# Patient Record
(demographics unavailable — no encounter records)

---

## 2025-01-16 NOTE — HISTORY OF PRESENT ILLNESS
[Spouse] : spouse [FreeTextEntry1] : 86F w/ CKD, HTN, vertigo, Asthma is coming in for follow up of tinnitus and cough. [de-identified] : 86F w/ CKD, HTN, vertigo, Asthma is coming in for follow up of tinnitus and cough.  Congested, phlegm, cough, nausea for 1 week. No fevers, myalgias.   Has an appointment with Nephrologist Dr. Faust in [facility] in the next few weeks.   Saw ENT, was told there is nothing she can do for her tinnitus, may self-resolve. Still symptomatic.

## 2025-01-16 NOTE — HISTORY OF PRESENT ILLNESS
[Spouse] : spouse [FreeTextEntry1] : 86F w/ CKD, HTN, vertigo, Asthma is coming in for follow up of tinnitus and cough. [de-identified] : 86F w/ CKD, HTN, vertigo, Asthma is coming in for follow up of tinnitus and cough.  Congested, phlegm, cough, nausea for 1 week. No fevers, myalgias.   Has an appointment with Nephrologist Dr. Faust in [facility] in the next few weeks.   Saw ENT, was told there is nothing she can do for her tinnitus, may self-resolve. Still symptomatic.

## 2025-06-01 NOTE — PHYSICAL EXAM
[No Respiratory Distress] : no respiratory distress  [No Accessory Muscle Use] : no accessory muscle use [Clear to Auscultation] : lungs were clear to auscultation bilaterally [Normal Rate] : normal rate  [Regular Rhythm] : with a regular rhythm [Normal Sclera/Conjunctiva] : normal sclera/conjunctiva [PERRL] : pupils equal round and reactive to light [EOMI] : extraocular movements intact [Normal] : the outer ears and nose were normal in appearance and the oropharynx was normal [No Lymphadenopathy] : no lymphadenopathy [Supple] : supple [Thyroid Normal, No Nodules] : the thyroid was normal and there were no nodules present [No Edema] : there was no peripheral edema [Soft] : abdomen soft [Non Tender] : non-tender [Non-distended] : non-distended [Normal Supraclavicular Nodes] : no supraclavicular lymphadenopathy [Normal Anterior Cervical Nodes] : no anterior cervical lymphadenopathy [No Focal Deficits] : no focal deficits [Speech Grossly Normal] : speech grossly normal [Normal Mood] : the mood was normal [de-identified] : Murmur at RUSB [de-identified] : does not know the year, poor recall, refused to complete rest of mini-mental status exam

## 2025-06-01 NOTE — HEALTH RISK ASSESSMENT
[Patient reported bone density results were abnormal] : Patient reported bone density results were abnormal [Yes] : Yes [Monthly or less (1 pt)] : Monthly or less (1 point) [1 or 2 (0 pts)] : 1 or 2 (0 points) [Never (0 pts)] : Never (0 points) [One fall no injury in past year] : Patient reported one fall in the past year without injury [0] : 2) Feeling down, depressed, or hopeless: Not at all (0) [PHQ-2 Negative - No further assessment needed] : PHQ-2 Negative - No further assessment needed [Lack of understanding] : lack of understanding [No] : Did not review medication list for presence of high-risk medications. [Former] : Former [20 or more] : 20 or more [> 15 Years] : > 15 Years [Audit-CScore] : 1 [CHS4Vsmcm] : 0 [Reports changes in hearing] : Reports no changes in hearing [Reports changes in vision] : Reports no changes in vision [MammogramDate] : 02/24 [MammogramComments] : BI-RADS 3 [BoneDensityDate] : 10/17 [BoneDensityComments] : Osteoporosis  [ColonoscopyDate] : 03/19

## 2025-06-01 NOTE — HISTORY OF PRESENT ILLNESS
[Family Member] : family member [de-identified] : 86F w/ CKD, HTN, Asthma, Osteoporosis is coming in for AWV.  History partially obtained from daughter, who is concerned about patient's memory.  Nephrologist Dr. Faust in [facility] Pulmonologist Dr. Zackary Camargo at [facility]  Following w/ Cardiology every 6 months.  Has no acute complaints today. Forgot to have DEXA and mammogram done. Patient does not remember what the plan from her Nephrologist was. Did not have Renal US done that was recommended by Nephro  Has been distraught due to  being in the hospital.   Following w/ Neurology but patient is unsure about name [FreeTextEntry1] : 86F w/ CKD, HTN, Asthma, Osteoporosis is coming in for AWV.

## 2025-06-01 NOTE — HEALTH RISK ASSESSMENT
[Patient reported bone density results were abnormal] : Patient reported bone density results were abnormal [Yes] : Yes [Monthly or less (1 pt)] : Monthly or less (1 point) [1 or 2 (0 pts)] : 1 or 2 (0 points) [Never (0 pts)] : Never (0 points) [One fall no injury in past year] : Patient reported one fall in the past year without injury [0] : 2) Feeling down, depressed, or hopeless: Not at all (0) [PHQ-2 Negative - No further assessment needed] : PHQ-2 Negative - No further assessment needed [Lack of understanding] : lack of understanding [No] : Did not review medication list for presence of high-risk medications. [Former] : Former [20 or more] : 20 or more [> 15 Years] : > 15 Years [Audit-CScore] : 1 [SUS3Runqo] : 0 [Reports changes in hearing] : Reports no changes in hearing [Reports changes in vision] : Reports no changes in vision [MammogramDate] : 02/24 [MammogramComments] : BI-RADS 3 [BoneDensityDate] : 10/17 [BoneDensityComments] : Osteoporosis  [ColonoscopyDate] : 03/19

## 2025-06-01 NOTE — HISTORY OF PRESENT ILLNESS
[Family Member] : family member [FreeTextEntry1] : 86F w/ CKD, HTN, Asthma, Osteoporosis is coming in for AWV.  [de-identified] : 86F w/ CKD, HTN, Asthma, Osteoporosis is coming in for AWV.  History partially obtained from daughter, who is concerned about patient's memory.  Nephrologist Dr. Faust in [facility] Pulmonologist Dr. Zackary Camargo at [facility]  Following w/ Cardiology every 6 months.  Has no acute complaints today. Forgot to have DEXA and mammogram done. Patient does not remember what the plan from her Nephrologist was. Did not have Renal US done that was recommended by Nephro  Has been distraught due to  being in the hospital.   Following w/ Neurology but patient is unsure about name

## 2025-06-01 NOTE — PHYSICAL EXAM
[No Respiratory Distress] : no respiratory distress  [No Accessory Muscle Use] : no accessory muscle use [Clear to Auscultation] : lungs were clear to auscultation bilaterally [Normal Rate] : normal rate  [Regular Rhythm] : with a regular rhythm [Normal Sclera/Conjunctiva] : normal sclera/conjunctiva [PERRL] : pupils equal round and reactive to light [EOMI] : extraocular movements intact [Normal] : the outer ears and nose were normal in appearance and the oropharynx was normal [No Lymphadenopathy] : no lymphadenopathy [Supple] : supple [Thyroid Normal, No Nodules] : the thyroid was normal and there were no nodules present [No Edema] : there was no peripheral edema [Soft] : abdomen soft [Non Tender] : non-tender [Non-distended] : non-distended [Normal Supraclavicular Nodes] : no supraclavicular lymphadenopathy [Normal Anterior Cervical Nodes] : no anterior cervical lymphadenopathy [No Focal Deficits] : no focal deficits [Speech Grossly Normal] : speech grossly normal [Normal Mood] : the mood was normal [de-identified] : Murmur at RUSB [de-identified] : does not know the year, poor recall, refused to complete rest of mini-mental status exam

## 2025-06-01 NOTE — ASSESSMENT
[FreeTextEntry1] : 86F w/ CKD, HTN, Asthma, Osteoporosis, Dementia is coming in for AWV.  #HTN -BP acceptable, c/w Diltiazem  #Dementia -Patient with poor recall, attention deficits -Daughter to take patient to Neuro for follow up  #CKD -Check CMP to ensure Cr is stable -Recommended having Renal US that was ordered by Nephro -Requested records from Nephro  #Abnormal mammogram -Overdue for mammo that was previously ordered - re-ordered  #Osteoporosis  -DEXA ordered  #Asthma -Following w/ Pulm  #weight loss -Poor appetite, unintentional -Check Chest Xray  #HCM -Labs as below -Recommended annual skin exam by Derm -Recommended dentist every 6 months -Recommended healthy diet and exercise -Mammo and DEXA ordered   RTC in 3 months